# Patient Record
Sex: FEMALE | Race: WHITE | NOT HISPANIC OR LATINO
[De-identification: names, ages, dates, MRNs, and addresses within clinical notes are randomized per-mention and may not be internally consistent; named-entity substitution may affect disease eponyms.]

---

## 2019-09-30 PROBLEM — Z00.00 ENCOUNTER FOR PREVENTIVE HEALTH EXAMINATION: Status: ACTIVE | Noted: 2019-09-30

## 2019-10-08 ENCOUNTER — APPOINTMENT (OUTPATIENT)
Dept: NEUROLOGY | Facility: CLINIC | Age: 54
End: 2019-10-08
Payer: COMMERCIAL

## 2019-10-08 VITALS
DIASTOLIC BLOOD PRESSURE: 76 MMHG | HEIGHT: 60 IN | SYSTOLIC BLOOD PRESSURE: 115 MMHG | OXYGEN SATURATION: 98 % | HEART RATE: 66 BPM | TEMPERATURE: 98.3 F | BODY MASS INDEX: 33.38 KG/M2 | WEIGHT: 170 LBS

## 2019-10-08 PROCEDURE — 99205 OFFICE O/P NEW HI 60 MIN: CPT

## 2019-10-08 RX ORDER — LEVOTHYROXINE SODIUM 0.17 MG/1
TABLET ORAL
Refills: 0 | Status: ACTIVE | COMMUNITY

## 2019-10-08 NOTE — HISTORY OF PRESENT ILLNESS
[FreeTextEntry1] : The patient is here for evaluation of headaches which started at age 52.   The headaches are described as throbbing pain was associated photophobia, no phonophobia, nausea vomiting. The headaches a moderate to severe in intensity and last all day, she had the headaches all the time. She takes Motrin daily, with some improvement of the headaches. She has not had imaging of the brain and has not been on preventative medications.  There is no focal weakness or sensory loss. The headache is post menopausal and the headache does not wake up the patient from sleep.

## 2019-10-08 NOTE — PHYSICAL EXAM
[General Appearance - Alert] : alert [General Appearance - In No Acute Distress] : in no acute distress [Person] : oriented to person [Place] : oriented to place [Time] : oriented to time [Registration Intact] : recent registration memory intact [Concentration Intact] : normal concentrating ability [Visual Intact] : visual attention was ~T not ~L decreased [Naming Objects] : no difficulty naming common objects [Repeating Phrases] : no difficulty repeating a phrase [Fluency] : fluency intact [Comprehension] : comprehension intact [Vocabulary] : adequate range of vocabulary [Cranial Nerves Optic (II)] : visual acuity intact bilaterally,  visual fields full to confrontation, pupils equal round and reactive to light [Cranial Nerves Oculomotor (III)] : extraocular motion intact [Cranial Nerves Trigeminal (V)] : facial sensation intact symmetrically [Cranial Nerves Facial (VII)] : face symmetrical [Cranial Nerves Vestibulocochlear (VIII)] : hearing was intact bilaterally [Cranial Nerves Glossopharyngeal (IX)] : tongue and palate midline [Cranial Nerves Accessory (XI - Cranial And Spinal)] : head turning and shoulder shrug symmetric [Cranial Nerves Hypoglossal (XII)] : there was no tongue deviation with protrusion [Motor Tone] : muscle tone was normal in all four extremities [Motor Strength] : muscle strength was normal in all four extremities [Involuntary Movements] : no involuntary movements were seen [Abnormal Walk] : normal gait [Sensation Tactile Decrease] : light touch was intact [Balance] : balance was intact [1+] : Patella left 1+ [Full Pulse] : the pedal pulses are present [Coordination - Dysmetria Impaired Finger-to-Nose Bilateral] : not present [Coordination - Dysmetria Impaired Heel-to-Shin Bilateral] : not present [Plantar Reflex Right Only] : normal on the right [Plantar Reflex Left Only] : normal on the left [FreeTextEntry5] : fundi not visualized

## 2019-10-08 NOTE — REVIEW OF SYSTEMS
[As Noted in HPI] : as noted in HPI [Fever] : no fever [Anxiety] : no anxiety [Eyesight Problems] : no eyesight problems [Loss Of Hearing] : no hearing loss [Chest Pain] : no chest pain [Shortness Of Breath] : no shortness of breath [Vomiting] : no vomiting [Incontinence] : no incontinence [Joint Pain] : no joint pain [Itching] : no itching [Muscle Weakness] : no muscle weakness [Easy Bleeding] : no tendency for easy bleeding

## 2019-10-08 NOTE — CONSULT LETTER
[Dear  ___] : Dear  [unfilled], [Please see my note below.] : Please see my note below. [Consult Closing:] : Thank you very much for allowing me to participate in the care of this patient.  If you have any questions, please do not hesitate to contact me. [Sincerely,] : Sincerely, [Consult Letter:] : I had the pleasure of evaluating your patient, [unfilled]. [FreeTextEntry3] : Glenis Penn MD, MPH

## 2019-10-08 NOTE — ASSESSMENT
[FreeTextEntry1] : Migraines\par Medication overuse headaches\par \par MRI brain\par Topamax will titrate to 25mg bid\par The patient will limit OTC medication use to maximum 3 days per week to minimize medication overuse headaches.\par Headache diary given.

## 2019-10-21 ENCOUNTER — FORM ENCOUNTER (OUTPATIENT)
Age: 54
End: 2019-10-21

## 2019-10-22 ENCOUNTER — APPOINTMENT (OUTPATIENT)
Dept: MRI IMAGING | Facility: HOSPITAL | Age: 54
End: 2019-10-22
Payer: COMMERCIAL

## 2019-10-22 ENCOUNTER — OUTPATIENT (OUTPATIENT)
Dept: OUTPATIENT SERVICES | Facility: HOSPITAL | Age: 54
LOS: 1 days | End: 2019-10-22
Payer: COMMERCIAL

## 2019-10-22 DIAGNOSIS — G44.40 DRUG-INDUCED HEADACHE, NOT ELSEWHERE CLASSIFIED, NOT INTRACTABLE: ICD-10-CM

## 2019-10-22 DIAGNOSIS — G43.709 CHRONIC MIGRAINE WITHOUT AURA, NOT INTRACTABLE, WITHOUT STATUS MIGRAINOSUS: ICD-10-CM

## 2019-10-22 PROCEDURE — 70551 MRI BRAIN STEM W/O DYE: CPT

## 2019-10-22 PROCEDURE — 70551 MRI BRAIN STEM W/O DYE: CPT | Mod: 26

## 2019-11-19 ENCOUNTER — APPOINTMENT (OUTPATIENT)
Dept: NEUROLOGY | Facility: CLINIC | Age: 54
End: 2019-11-19
Payer: COMMERCIAL

## 2019-11-19 VITALS
DIASTOLIC BLOOD PRESSURE: 76 MMHG | SYSTOLIC BLOOD PRESSURE: 124 MMHG | HEIGHT: 60 IN | HEART RATE: 69 BPM | WEIGHT: 170 LBS | BODY MASS INDEX: 33.38 KG/M2 | OXYGEN SATURATION: 98 %

## 2019-11-19 PROCEDURE — 99213 OFFICE O/P EST LOW 20 MIN: CPT

## 2019-11-19 NOTE — HISTORY OF PRESENT ILLNESS
[FreeTextEntry1] : The patient is here for evaluation of headaches which started at age 52. The headaches are described as throbbing pain was associated photophobia, no phonophobia, nausea vomiting. The headaches a moderate to severe in intensity and last all day, she had the headaches all the time. She takes Motrin daily, with some improvement of the headaches. She has not had imaging of the brain and has not been on preventative medications. There is no focal weakness or sensory loss. The headache is post menopausal and the headache does not wake up the patient from sleep. \par \par The patient was asked to start Topamax at last visit but has not done so, she was afraid of side effects.  She decreased use of OTC for headaches with some time improvement of the headache intensity and frequency.

## 2019-11-19 NOTE — DATA REVIEWED
[de-identified] : images reviewed: Scattered T2/FLAIR hyperintense foci in the subcortical and periventricular \par white matter, which are a nonspecific finding, that may represent sequelae \par of microangiopathic, postinflammatory/postinfectious and-or demyelinating \par etiology.

## 2019-11-19 NOTE — PHYSICAL EXAM
[General Appearance - Alert] : alert [General Appearance - In No Acute Distress] : in no acute distress [Person] : oriented to person [Place] : oriented to place [Time] : oriented to time [Registration Intact] : recent registration memory intact [Concentration Intact] : normal concentrating ability [Naming Objects] : no difficulty naming common objects [Repeating Phrases] : no difficulty repeating a phrase [Comprehension] : comprehension intact [Fluency] : fluency intact [Vocabulary] : adequate range of vocabulary [Cranial Nerves Optic (II)] : visual acuity intact bilaterally,  visual fields full to confrontation, pupils equal round and reactive to light [Cranial Nerves Trigeminal (V)] : facial sensation intact symmetrically [Cranial Nerves Oculomotor (III)] : extraocular motion intact [Cranial Nerves Facial (VII)] : face symmetrical [Motor Tone] : muscle tone was normal in all four extremities [Motor Strength] : muscle strength was normal in all four extremities [Sensation Tactile Decrease] : light touch was intact [Abnormal Walk] : normal gait [Balance] : balance was intact

## 2020-02-13 ENCOUNTER — RESULT REVIEW (OUTPATIENT)
Age: 55
End: 2020-02-13

## 2020-02-19 ENCOUNTER — APPOINTMENT (OUTPATIENT)
Dept: NEUROLOGY | Facility: CLINIC | Age: 55
End: 2020-02-19
Payer: COMMERCIAL

## 2020-02-19 VITALS
WEIGHT: 166 LBS | DIASTOLIC BLOOD PRESSURE: 75 MMHG | OXYGEN SATURATION: 98 % | HEIGHT: 60 IN | HEART RATE: 66 BPM | BODY MASS INDEX: 32.59 KG/M2 | TEMPERATURE: 97.8 F | SYSTOLIC BLOOD PRESSURE: 108 MMHG

## 2020-02-19 PROCEDURE — 99213 OFFICE O/P EST LOW 20 MIN: CPT

## 2020-02-19 NOTE — PHYSICAL EXAM
[General Appearance - Alert] : alert [General Appearance - In No Acute Distress] : in no acute distress [Person] : oriented to person [Place] : oriented to place [Time] : oriented to time [Concentration Intact] : normal concentrating ability [Fluency] : fluency intact [Naming Objects] : no difficulty naming common objects [Vocabulary] : adequate range of vocabulary [Cranial Nerves Oculomotor (III)] : extraocular motion intact [Cranial Nerves Optic (II)] : visual acuity intact bilaterally,  visual fields full to confrontation, pupils equal round and reactive to light [Motor Tone] : muscle tone was normal in all four extremities [Motor Strength] : muscle strength was normal in all four extremities [Cranial Nerves Facial (VII)] : face symmetrical [Abnormal Walk] : normal gait [Balance] : balance was intact

## 2020-02-19 NOTE — ASSESSMENT
[FreeTextEntry1] : Migraines and Medication overuse headaches, now well controlled\par Topamax, 25mg daily (gets good control with that, unable to tolerate 25mg bid)\par The patient will limit OTC medication use to maximum 3 days per week to minimize medication overuse headaches.\par Headache diary given. \par \par Ear fullness, will refer to ENT\par

## 2020-02-19 NOTE — HISTORY OF PRESENT ILLNESS
[FreeTextEntry1] : The patient is here for evaluation of headaches which started at age 52. The headaches are described as throbbing pain was associated photophobia, no phonophobia, nausea vomiting. The headaches a moderate to severe in intensity and last all day, she had the headaches all the time. She takes Motrin daily, with some improvement of the headaches. She has not had imaging of the brain and has not been on preventative medications. There is no focal weakness or sensory loss. The headache is post menopausal and the headache does not wake up the patient from sleep. \par \par The patient is on Topamax, 25mg daily (gets good control with that, unable to tolerate 25mg bid) with good control of the headaches.  She decreased use of OTC for headaches.  She gets the headaches now very infrequently.\par

## 2020-08-26 ENCOUNTER — APPOINTMENT (OUTPATIENT)
Dept: OTOLARYNGOLOGY | Facility: CLINIC | Age: 55
End: 2020-08-26
Payer: COMMERCIAL

## 2020-08-26 VITALS
SYSTOLIC BLOOD PRESSURE: 124 MMHG | HEART RATE: 74 BPM | DIASTOLIC BLOOD PRESSURE: 79 MMHG | HEIGHT: 60 IN | BODY MASS INDEX: 33.18 KG/M2 | TEMPERATURE: 97.8 F | WEIGHT: 169 LBS

## 2020-08-26 DIAGNOSIS — Z83.3 FAMILY HISTORY OF DIABETES MELLITUS: ICD-10-CM

## 2020-08-26 DIAGNOSIS — Z78.9 OTHER SPECIFIED HEALTH STATUS: ICD-10-CM

## 2020-08-26 PROCEDURE — 92557 COMPREHENSIVE HEARING TEST: CPT

## 2020-08-26 PROCEDURE — 99204 OFFICE O/P NEW MOD 45 MIN: CPT | Mod: 25

## 2020-08-26 PROCEDURE — 92567 TYMPANOMETRY: CPT

## 2020-08-26 PROCEDURE — 92504 EAR MICROSCOPY EXAMINATION: CPT

## 2020-08-26 RX ORDER — MONTELUKAST 10 MG/1
10 TABLET, FILM COATED ORAL
Qty: 30 | Refills: 2 | Status: ACTIVE | COMMUNITY
Start: 2020-08-26 | End: 1900-01-01

## 2020-08-26 RX ORDER — FLUNISOLIDE 0.25 MG/ML
25 MCG/ACT SOLUTION NASAL
Refills: 0 | Status: COMPLETED | COMMUNITY
End: 2020-08-26

## 2020-08-26 RX ORDER — TOPIRAMATE 25 MG/1
25 TABLET, FILM COATED ORAL AT BEDTIME
Qty: 30 | Refills: 11 | Status: COMPLETED | COMMUNITY
Start: 2019-10-08 | End: 2020-08-26

## 2020-08-26 RX ORDER — AZELASTINE HYDROCHLORIDE AND FLUTICASONE PROPIONATE 137; 50 UG/1; UG/1
137-50 SPRAY, METERED NASAL
Refills: 0 | Status: COMPLETED | COMMUNITY
End: 2020-08-26

## 2020-08-26 RX ORDER — AZELASTINE HYDROCHLORIDE 137 UG/1
0.1 SPRAY, METERED NASAL
Qty: 1 | Refills: 2 | Status: ACTIVE | COMMUNITY
Start: 2020-08-26 | End: 1900-01-01

## 2020-08-26 RX ORDER — HYDROXYCHLOROQUINE SULFATE 400 MG/1
TABLET ORAL
Refills: 0 | Status: ACTIVE | COMMUNITY

## 2020-08-26 NOTE — PROCEDURE
[Same] : same as the Pre Op Dx. [] : Binocular Microscopy [FreeTextEntry1] : juancho ear fullness [FreeTextEntry4] : none [FreeTextEntry6] : Operative microscope was used to examine the ear canal, ear drum and visible middle ear landmarks. Adequate exam would not have been possible without the use of a microscope. Findings are described.\par \par

## 2020-08-26 NOTE — HISTORY OF PRESENT ILLNESS
[de-identified] : 55 year woman referred by Dr. Glenis Penn, neurologist for ear fullness and sinus issues. Reports left ear fullness and left facial swelling after dental surgery 2013. Complains of intermittent left otalgia, clear otorrhea. States hearing is muffled, minor balance issues. Reports constant post nasal drip. Denies ear infections in the last 6 months. Currently taking levothyroxine and hydroxychloroquine.

## 2020-08-26 NOTE — REASON FOR VISIT
[Initial Consultation] : an initial consultation for [FreeTextEntry2] : referred by Dr. Glenis Penn, neurologist for ear fullness and sinus issues.

## 2020-08-26 NOTE — PHYSICAL EXAM
[de-identified] : mild TTP juancho [Midline] : trachea located in midline position [Normal] : orientation to person, place, and time: normal

## 2020-09-28 ENCOUNTER — APPOINTMENT (OUTPATIENT)
Dept: NEUROLOGY | Facility: CLINIC | Age: 55
End: 2020-09-28

## 2020-10-06 ENCOUNTER — APPOINTMENT (OUTPATIENT)
Dept: OTOLARYNGOLOGY | Facility: CLINIC | Age: 55
End: 2020-10-06
Payer: COMMERCIAL

## 2020-10-06 DIAGNOSIS — H93.8X9 OTHER SPECIFIED DISORDERS OF EAR, UNSPECIFIED EAR: ICD-10-CM

## 2020-10-06 DIAGNOSIS — G43.709 CHRONIC MIGRAINE W/OUT AURA, NOT INTRACTABLE, W/OUT STATUS MIGRAINOSUS: ICD-10-CM

## 2020-10-06 DIAGNOSIS — G44.40 DRUG-INDUCED HEADACHE, NOT ELSEWHERE CLASSIFIED, NOT INTRACTABLE: ICD-10-CM

## 2020-10-06 PROCEDURE — 99213 OFFICE O/P EST LOW 20 MIN: CPT

## 2020-10-07 PROBLEM — G44.40 MEDICATION OVERUSE HEADACHE: Status: ACTIVE | Noted: 2019-10-08

## 2020-10-07 PROBLEM — G43.709 CHRONIC MIGRAINE: Status: ACTIVE | Noted: 2019-10-08

## 2020-10-07 PROBLEM — H93.8X9 EAR FULLNESS: Status: ACTIVE | Noted: 2020-02-19

## 2020-10-07 NOTE — HISTORY OF PRESENT ILLNESS
[de-identified] : 54 y/o woman here for f/u hearing loss, ETD and migraines.   Tried Mg+ but stopped due to GI disturbance.  Pt. has had good results with dietary changes and hydration.  Taking the Zyrtec, Astelin. Ears are less full.  Overall doing better.

## 2023-07-11 NOTE — ASSESSMENT
[FreeTextEntry1] : Migraines\par Medication overuse headaches\par \par Topamax will titrate to 25mg bid\par The patient will limit OTC medication use to maximum 3 days per week to minimize medication overuse headaches.\par Headache diary given. \par 
No
